# Patient Record
Sex: FEMALE | Race: WHITE
[De-identification: names, ages, dates, MRNs, and addresses within clinical notes are randomized per-mention and may not be internally consistent; named-entity substitution may affect disease eponyms.]

---

## 2019-04-08 ENCOUNTER — HOSPITAL ENCOUNTER (EMERGENCY)
Dept: HOSPITAL 10 - FTE | Age: 59
Discharge: HOME | End: 2019-04-08
Payer: COMMERCIAL

## 2019-04-08 ENCOUNTER — HOSPITAL ENCOUNTER (EMERGENCY)
Dept: HOSPITAL 91 - FTE | Age: 59
Discharge: HOME | End: 2019-04-08
Payer: COMMERCIAL

## 2019-04-08 VITALS — RESPIRATION RATE: 17 BRPM | DIASTOLIC BLOOD PRESSURE: 66 MMHG | HEART RATE: 57 BPM | SYSTOLIC BLOOD PRESSURE: 153 MMHG

## 2019-04-08 VITALS — HEIGHT: 55 IN | WEIGHT: 149.69 LBS | BODY MASS INDEX: 34.64 KG/M2

## 2019-04-08 DIAGNOSIS — R04.2: Primary | ICD-10-CM

## 2019-04-08 LAB
ADD MAN DIFF?: NO
ALANINE AMINOTRANSFERASE: 22 IU/L (ref 13–69)
ALBUMIN/GLOBULIN RATIO: 1.04
ALBUMIN: 4.3 G/DL (ref 3.3–4.9)
ALKALINE PHOSPHATASE: 131 IU/L (ref 42–121)
ANION GAP: 9 (ref 5–13)
ASPARTATE AMINO TRANSFERASE: 28 IU/L (ref 15–46)
BASOPHIL #: 0 10^3/UL (ref 0–0.1)
BASOPHILS %: 0.3 % (ref 0–2)
BILIRUBIN,DIRECT: 0 MG/DL (ref 0–0.2)
BILIRUBIN,TOTAL: 0.5 MG/DL (ref 0.2–1.3)
BLOOD UREA NITROGEN: 15 MG/DL (ref 7–20)
CALCIUM: 9.5 MG/DL (ref 8.4–10.2)
CARBON DIOXIDE: 26 MMOL/L (ref 21–31)
CHLORIDE: 105 MMOL/L (ref 97–110)
CREATININE: 0.49 MG/DL (ref 0.44–1)
EOSINOPHILS #: 0.2 10^3/UL (ref 0–0.5)
EOSINOPHILS %: 2.4 % (ref 0–7)
GLOBULIN: 4.1 G/DL (ref 1.3–3.2)
GLUCOSE: 100 MG/DL (ref 70–220)
HEMATOCRIT: 42.9 % (ref 37–47)
HEMOGLOBIN: 13.8 G/DL (ref 12–16)
IMMATURE GRANS #M: 0.04 10^3/UL (ref 0–0.03)
IMMATURE GRANS % (M): 0.5 % (ref 0–0.43)
LYMPHOCYTES #: 2.3 10^3/UL (ref 0.8–2.9)
LYMPHOCYTES %: 29.8 % (ref 15–51)
MEAN CORPUSCULAR HEMOGLOBIN: 30.3 PG (ref 29–33)
MEAN CORPUSCULAR HGB CONC: 32.2 G/DL (ref 32–37)
MEAN CORPUSCULAR VOLUME: 94.1 FL (ref 82–101)
MEAN PLATELET VOLUME: 10 FL (ref 7.4–10.4)
MONOCYTE #: 0.5 10^3/UL (ref 0.3–0.9)
MONOCYTES %: 6.9 % (ref 0–11)
NEUTROPHIL #: 4.5 10^3/UL (ref 1.6–7.5)
NEUTROPHILS %: 60.1 % (ref 39–77)
NUCLEATED RED BLOOD CELLS #: 0 10^3/UL (ref 0–0)
NUCLEATED RED BLOOD CELLS%: 0 /100WBC (ref 0–0)
PLATELET COUNT: 239 10^3/UL (ref 140–415)
POTASSIUM: 4 MMOL/L (ref 3.5–5.1)
RED BLOOD COUNT: 4.56 10^6/UL (ref 4.2–5.4)
RED CELL DISTRIBUTION WIDTH: 12.3 % (ref 11.5–14.5)
SODIUM: 140 MMOL/L (ref 135–144)
TOTAL PROTEIN: 8.4 G/DL (ref 6.1–8.1)
WHITE BLOOD COUNT: 7.5 10^3/UL (ref 4.8–10.8)

## 2019-04-08 PROCEDURE — 71250 CT THORAX DX C-: CPT

## 2019-04-08 PROCEDURE — 80053 COMPREHEN METABOLIC PANEL: CPT

## 2019-04-08 PROCEDURE — 85025 COMPLETE CBC W/AUTO DIFF WBC: CPT

## 2019-04-08 PROCEDURE — 36415 COLL VENOUS BLD VENIPUNCTURE: CPT

## 2019-04-08 PROCEDURE — 70490 CT SOFT TISSUE NECK W/O DYE: CPT

## 2019-04-08 PROCEDURE — 99284 EMERGENCY DEPT VISIT MOD MDM: CPT

## 2019-07-06 NOTE — ERD
ER Documentation


Chief Complaint


Chief Complaint





THROAT PAIN, EAR PAIN X2 WEEKS





HPI


58-year-old female with no past medical or surgical history who presents with 2-


week complaint of right-sided throat pain.  Patient describes a feeling of a 


lump in her throat for over a year.  Now with several episodes of hemoptysis 


prompting concern of family and presentation to ED.  States she spit up a ball 


of blood twice with the last episode occurring on Friday.  Also with reported 


dysphagia to solids but not liquids.  Denies GERD-like symptoms such as burning 


type upper epigastric pain.  Denies history of smoking or drug abuse.  Never had


a workup for these symptoms previously.  She denies any family history of oral 


or throat cancer.  Reports approximate 10-15 pound weight loss over the past 3 


months.  Also has not really much had an appetite secondary to dysphagia.  She 


denies fevers, chills, CP, cough, shortness of breath, nausea, vomiting, 


abdominal pain, urinary symptoms.  





At time of evaluation patient is nontoxic-appearing with normal vital signs 


including blood pressure and heart rate of 57.





ROS


All systems reviewed and are negative except as per history of present illness.





Medications


Home Meds


Active Scripts


Oxymetazoline Hcl* (Afrin Spray*) 0.05% - 15 Ml Spray, 2 SPRAYS NASAL BID for 3 


Days, #1 EA


   to each nostril


   Prov:JESSE SOSA PA-C         4/8/19


Naproxen* (Naprosyn*) 500 Mg Tablet, 500 MG PO BID PRN for PAIN AND/OR 


INFLAMMATION, #30 TAB


   Prov:EJSSE SOSA PA-C         4/8/19


Tramadol HCl (Tramadol HCl) 50 Mg Tablet, 50 MG PO Q4 PRN for PAIN, #20 TAB


   Prov:GABRIELE ACHARYA         3/8/16


Naproxen* (Naprosyn*) 500 Mg Tablet, 500 MG PO BID PRN for PAIN AND/OR 


INFLAMMATION, #30 TAB


   Prov:GABRIELE ACHARYA         3/8/16





Allergies


Allergies:  


Coded Allergies:  


     No Known Allergy (Unverified , 3/7/16)





PMhx/Soc


History of Surgery:  No


Anesthesia Reaction:  No


Hx Neurological Disorder:  No


Hx Respiratory Disorders:  No


Hx Cardiac Disorders:  No


Hx Psychiatric Problems:  No


Hx Miscellaneous Medical Probl:  No


Hx Alcohol Use:  No


Hx Substance Use:  No


Hx Tobacco Use:  No


Smoking Status:  Never smoker





Physical Exam


Vitals





Vital Signs


  Date      Temp  Pulse  Resp  B/P (MAP)   Pulse Ox  O2          O2 Flow    FiO2


Time                                                 Delivery    Rate


    4/8/19  98.9     57    17      153/66        98


     13:35                           (95)





Physical Exam


Const:   No acute distress


Head:   Atraumatic 


Eyes:    Normal Conjunctiva


ENT:    Normal External Ears, Nose and Mouth.


Neck:               Full range of motion. No meningismus, soft tenderness to 


palpation to R anterior neck, no swelling or erythema, no lymphadenopathy.


Resp:   Clear to auscultation bilaterally


Cardio:   Regular rate and rhythm, no murmurs


Abd:    Soft, non tender, non distended. Normal bowel sounds


Skin:   No petechiae or rashes


Back:   No midline or flank tenderness


Ext:    No cyanosis, or edema


Neur:   Awake and alert


Psych:    Normal Mood and Affect


Result Diagram:  


4/8/19 1520                                                                     


          4/8/19 1520





Results 24 hrs





Laboratory Tests


              Test
                                  4/8/19
15:20


              White Blood Count                      7.5 10^3/ul


              Red Blood Count                       4.56 10^6/ul


              Hemoglobin                               13.8 g/dl


              Hematocrit                                  42.9 %


              Mean Corpuscular Volume                    94.1 fl


              Mean Corpuscular Hemoglobin                30.3 pg


              Mean Corpuscular Hemoglobin
Concent     32.2 g/dl 



              Red Cell Distribution Width                 12.3 %


              Platelet Count                         239 10^3/UL


              Mean Platelet Volume                       10.0 fl


              Immature Granulocytes %                    0.500 %


              Neutrophils %                               60.1 %


              Lymphocytes %                               29.8 %


              Monocytes %                                  6.9 %


              Eosinophils %                                2.4 %


              Basophils %                                  0.3 %


              Nucleated Red Blood Cells %            0.0 /100WBC


              Immature Granulocytes #              0.040 10^3/ul


              Neutrophils #                          4.5 10^3/ul


              Lymphocytes #                          2.3 10^3/ul


              Monocytes #                            0.5 10^3/ul


              Eosinophils #                          0.2 10^3/ul


              Basophils #                            0.0 10^3/ul


              Nucleated Red Blood Cells #            0.0 10^3/ul


              Sodium Level                            140 mmol/L


              Potassium Level                         4.0 mmol/L


              Chloride Level                          105 mmol/L


              Carbon Dioxide Level                     26 mmol/L


              Anion Gap                                        9


              Blood Urea Nitrogen                       15 mg/dl


              Creatinine                              0.49 mg/dl


              Est Glomerular Filtrat Rate
mL/min   > 60 mL/min 



              Glucose Level                            100 mg/dl


              Calcium Level                            9.5 mg/dl


              Total Bilirubin                          0.5 mg/dl


              Direct Bilirubin                        0.00 mg/dl


              Indirect Bilirubin                       0.5 mg/dl


              Aspartate Amino Transf
(AST/SGOT)         28 IU/L 



              Alanine Aminotransferase
(ALT/SGPT)       22 IU/L 



              Alkaline Phosphatase                      131 IU/L


              Total Protein                             8.4 g/dl


              Albumin                                   4.3 g/dl


              Globulin                                 4.10 g/dl


              Albumin/Globulin Ratio                        1.04








Procedures/MDM


58-year-old female with no past medical history who presents with complaint is 


of a globus-like sensation in throat and hemoptysis.  Doubt infectious DAH.  


Doubt pulmonary TB given lack of risk factors.  Unlikely PE given history, exam,


unremarkable studies.  Unlikely presentation of vasculitis.  Symptoms could be 


secondary to GERD or bronchitis but history not convincing for such.  Patient is


hemodynamically stable with appropriate H&H.  Given unremarkable workup in the 


ED including CT of chest and neck she warrants additional workup with her PMD.





Doubt infectious DAH. Doubt pulmonary TB. Unlikely PE causing distal lung 


necrosis. No h/o tracheostomy to invade the innominate artery. Unlikely initial 


presentation of vasculitis.








ED course:


CT of chest and neck without acute or chronic findings to explain symptoms, read


as mastoiditis but patient without symptoms consistent with reading.  Case 


discussed with attending.  Patient seen and examined by attending. 


Some paranasal sinus findings


Short course of afrin an pain control








Dispo:  DC home with appropriate return precautions. Follow up with PCP and 


appropriate specialist (GI, ENT) for further evaluation.





Departure


Condition:  Stable











JESSE SOSA PA-C              Apr 8, 2019 15:05
No